# Patient Record
Sex: FEMALE | Race: WHITE | URBAN - METROPOLITAN AREA
[De-identification: names, ages, dates, MRNs, and addresses within clinical notes are randomized per-mention and may not be internally consistent; named-entity substitution may affect disease eponyms.]

---

## 2017-01-09 ENCOUNTER — OFFICE VISIT (OUTPATIENT)
Dept: FAMILY MEDICINE | Facility: CLINIC | Age: 47
End: 2017-01-09
Payer: COMMERCIAL

## 2017-01-09 VITALS
SYSTOLIC BLOOD PRESSURE: 121 MMHG | HEART RATE: 103 BPM | WEIGHT: 196.5 LBS | TEMPERATURE: 96.5 F | DIASTOLIC BLOOD PRESSURE: 82 MMHG

## 2017-01-09 DIAGNOSIS — Z90.49 S/P CHOLECYSTECTOMY: Primary | ICD-10-CM

## 2017-01-09 PROCEDURE — 99202 OFFICE O/P NEW SF 15 MIN: CPT | Performed by: PHYSICIAN ASSISTANT

## 2017-01-09 NOTE — MR AVS SNAPSHOT
"              After Visit Summary   2017    Leonila Quintanilla    MRN: [id number]           Patient Information     Date Of Birth          1970        Visit Information        Provider Department      2017 12:40 PM Kelsie Buenrostro PA-C [facility]         Follow-ups after your visit        Who to contact     If you have questions or need follow up information about today's clinic visit or your schedule please contact [facility] directly at [phone].  Normal or non-critical lab and imaging results will be communicated to you by MyChart, letter or phone within 4 business days after the clinic has received the results. If you do not hear from us within 7 days, please contact the clinic through [facility] or phone. If you have a critical or abnormal lab result, we will notify you by phone as soon as possible.  Submit refill requests through [employer] or call your pharmacy and they will forward the refill request to us. Please allow 3 business days for your refill to be completed.          Additional Information About Your Visit        [facility] Information     [employer] lets you send messages to your doctor, view your test results, renew your prescriptions, schedule appointments and more. To sign up, go to [email or web address] . Click on \"Log in\" on the left side of the screen, which will take you to the Welcome page. Then click on \"Sign up Now\" on the right side of the page.     You will be asked to enter the access code listed below, as well as some personal information. Please follow the directions to create your username and password.     Your access code is: [email or web address]  Expires: 2017  1:14 PM     Your access code will  in 90 days. If you need help or a new code, please call your [facility] or [phone].        Care EveryWhere ID     This is your Care EveryWhere ID. This could be used by other organizations to access your [place] medical " records  [id number]        Your Vitals Were     Pulse Temperature Last Period Breastfeeding?          103 96.5  F (35.8  C) (Oral) 12/01/2016 (Approximate) No         Blood Pressure from Last 3 Encounters:   01/09/17 121/82    Weight from Last 3 Encounters:   01/09/17 196 lb 8 oz (89.132 kg)              Today, you had the following     No orders found for display       Primary Care Provider    None Specified       No primary provider on file.        Thank you!     Thank you for choosing [facility]  for your care. Our goal is always to provide you with excellent care. Hearing back from our patients is one way we can continue to improve our services. Please take a few minutes to complete the written survey that you may receive in the mail after your visit with us. Thank you!             Your Updated Medication List - Protect others around you: Learn how to safely use, store and throw away your medicines at www.disposemymeds.org.      Notice  As of 1/9/2017  1:14 PM    You have not been prescribed any medications.

## 2017-01-09 NOTE — PROGRESS NOTES
SUBJECTIVE:                                                    Leonila Quintanilla is a 46 year old female who presents to clinic today for the following health issues:      New Patient/Transfer of Care    Had cholecystectomy 12/30/16, and is concerned that the site might be infected.  Better today.  Itches and pink.  Glue came off.  Surgeon didn't give her any specific regulations for work restrictions    .  Does some lifting.  Has felt some pressure at belly button with lifting.  Sees surgeon on Wed.    laparoscopic surgery.  Surgery went well.  Home the next day.  No fevers or vomiting.       Has not gone back to work.          Problem list and histories reviewed & adjusted, as indicated.  Additional history: as documented    There is no problem list on file for this patient.    Past Surgical History   Procedure Laterality Date     Cholecystostomy  Dec 2016       Social History   Substance Use Topics     Smoking status: Never Smoker      Smokeless tobacco: Not on file     Alcohol Use: Yes     History reviewed. No pertinent family history.        ROS:  As above    OBJECTIVE:                                                    /82 mmHg  Pulse 103  Temp(Src) 96.5  F (35.8  C) (Oral)  Wt 196 lb 8 oz (89.132 kg)  LMP 12/01/2016 (Approximate)  Breastfeeding? No  There is no height on file to calculate BMI.  GENERAL: healthy, alert and no distress  RESP: lungs clear to auscultation - no rales, rhonchi or wheezes  CV: regular rates and rhythm, normal S1 S2, no S3 or S4 and no murmur, click or rub  ABDOMEN: tenderness RUQ and umbilical, bowel sounds normal and well-healed incision umblical, epigastric and 2 RUQ    Diagnostic Test Results:  none      ASSESSMENT/PLAN:                                                        1. S/P cholecystectomy  Healing well.  FMLA paperwork filled out.        FUTURE APPOINTMENTS:       - Follow-up for annual visit or as needed    Kelsie Buenrostro,  MICHELLE  [facility]

## 2017-03-28 ENCOUNTER — TELEPHONE (OUTPATIENT)
Dept: FAMILY MEDICINE | Facility: CLINIC | Age: 47
End: 2017-03-28

## 2017-03-28 NOTE — TELEPHONE ENCOUNTER
Panel Management Review      Patient has the following on her problem list: None      Composite cancer screening  Chart review shows that this patient is due/due soon for the following Pap Smear  Summary:    Patient is due/failing the following:   PAP    Action needed:   Patient needs office visit for physical with pap screen.    Type of outreach:    Phone, left message for patient to call back.     Questions for provider review:    None                                                                                                                                    GALLO Roe MA       Chart routed to Care Team .

## 2017-03-28 NOTE — LETTER
April 4, 2017    Leonila Quintanilla  [address] [place] [id number]      Dear Leonila Quintanilla,     We have tried to contact you about your health, but have been unable to reach you.  Please call us as soon as possible so we can provide you with the best care possible.  We will continue to check in with you throughout the year to complete these items of care, if you are not able to complete these items at this time.  If you would like to complete the missing items for your care, please contact us at [phone].    We recommend the following:  -schedule a PAP SMEAR EXAM which is due.  Please disregard this reminder if you have had this exam elsewhere within the last year.  It would be helpful for us to have a copy of your recent pap smear report in our file so that we can best coordinate your care.    Sincerely,     Your Care Team at [place]    CORBIN Tyler

## 2017-03-28 NOTE — LETTER
March 28, 2017    Leonila Quintanilla  [address] [place] [id number]    Dear Leonila    We care about your health and have reviewed your health plan. We have reviewed your medical conditions, medication list, and lab results and are making recommendations based on this review, to better manage your health.    You are in particular need of attention regarding:  - Scheduling a Physical with a Cervical Cancer Screening (Pap Smear) age 64 and younger [phone]      Here is a list of Health Maintenance topics that are due now or due soon:  Health Maintenance Due   Topic Date Due     TETANUS IMMUNIZATION (SYSTEM ASSIGNED)  04/05/1989     PAP SCREENING Q3 YR (SYSTEM ASSIGNED)  04/05/1992     LIPID SCREEN Q5 YR FEMALE (SYSTEM ASSIGNED)  04/05/2016     We will be calling you in the next couple of weeks to help you schedule any appointments that are needed.  Please call us at [phone] (or use [employer] to address the above recommendations.     Thank you for trusting [facility] and we appreciate the opportunity to serve you.  We look forward to supporting your healthcare needs in the future.    Healthy Regards,    CORBIN Tyler

## 2017-09-11 ENCOUNTER — TELEPHONE (OUTPATIENT)
Dept: FAMILY MEDICINE | Facility: CLINIC | Age: 47
End: 2017-09-11

## 2017-09-11 NOTE — LETTER
September 11, 2017    Leonila Quintanilla  [address] [place] [id number]    Dear Leonila    We care about your health and have reviewed your health plan. We have reviewed your medical conditions, medication list, and lab results and are making recommendations based on this review, to better manage your health.    You are in particular need of attention regarding:  - Scheduling a Physical with a Cervical Cancer Screening (Pap Smear) age 64 and younger [phone]      Here is a list of Health Maintenance topics that are due now or due soon:  Health Maintenance Due   Topic Date Due     TETANUS IMMUNIZATION (SYSTEM ASSIGNED)  04/05/1989     PAP SCREENING Q3 YR (SYSTEM ASSIGNED)  04/05/1992     LIPID SCREEN Q5 YR FEMALE (SYSTEM ASSIGNED)  04/05/2016     INFLUENZA VACCINE (SYSTEM ASSIGNED)  09/01/2017     We will be calling you in the next couple of weeks to help you schedule any appointments that are needed.  Please call us at [phone] (or use [employer]) to address the above recommendations.     Thank you for trusting [facility] and we appreciate the opportunity to serve you.  We look forward to supporting your healthcare needs in the future.    Healthy Regards,    CORBIN Tyler/CM

## 2019-06-04 ENCOUNTER — OFFICE VISIT (OUTPATIENT)
Dept: FAMILY MEDICINE | Facility: CLINIC | Age: 49
End: 2019-06-04
Payer: COMMERCIAL

## 2019-06-04 VITALS
SYSTOLIC BLOOD PRESSURE: 120 MMHG | TEMPERATURE: 98.3 F | RESPIRATION RATE: 16 BRPM | HEIGHT: 64 IN | BODY MASS INDEX: 35.17 KG/M2 | OXYGEN SATURATION: 97 % | DIASTOLIC BLOOD PRESSURE: 89 MMHG | WEIGHT: 206 LBS | HEART RATE: 117 BPM

## 2019-06-04 DIAGNOSIS — J32.0 LEFT MAXILLARY SINUSITIS: Primary | ICD-10-CM

## 2019-06-04 PROCEDURE — 99213 OFFICE O/P EST LOW 20 MIN: CPT | Performed by: NURSE PRACTITIONER

## 2019-06-04 ASSESSMENT — MIFFLIN-ST. JEOR: SCORE: 1549.41

## 2019-06-04 ASSESSMENT — PAIN SCALES - GENERAL: PAINLEVEL: NO PAIN (0)

## 2019-06-04 NOTE — PATIENT INSTRUCTIONS
Start augmentin 1 tab every 12 hours x10 days  Hot, steamy shower this evening  Apply warm pack to face  If worsening this weekend go to emergency department  Follow up with primary care provider in the next week

## 2019-06-04 NOTE — PROGRESS NOTES
"Subjective     Leonila Quintanilla is a 49 year old female who presents to clinic today for the following health issues:    HPI   Acute Illness   Acute illness concerns: sinus pressure  Onset: since today     Fever: no    Chills/Sweats: no    Headache (location?): no    Sinus Pressure:YES    Conjunctivitis:  no    Ear Pain: no    Rhinorrhea: no    Congestion: no    Sore Throat: no     Cough: no    Wheeze: no    Decreased Appetite: no    Nausea: no    Vomiting: no    Diarrhea:  no    Dysuria/Freq.: no    Fatigue/Achiness: no    Sick/Strep Exposure: no     Therapies Tried and outcome: claritin and ibuprofen     -swollen left cheek   -when sneezing, there is a strange ordor  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    Past Surgical History:   Procedure Laterality Date     CHOLECYSTOSTOMY  Dec 2016       Social History     Tobacco Use     Smoking status: Never Smoker     Smokeless tobacco: Never Used   Substance Use Topics     Alcohol use: Yes     History reviewed. No pertinent family history.      Current Outpatient Medications   Medication Sig Dispense Refill     amoxicillin-clavulanate (AUGMENTIN) 875-125 MG tablet Take 1 tablet by mouth 2 times daily for 10 days 20 tablet 0     No Known Allergies    Reviewed and updated as needed this visit by Provider  Tobacco  Allergies  Meds  Problems  Med Hx  Surg Hx  Fam Hx         Review of Systems   ROS COMP: Constitutional, HEENT, cardiovascular, pulmonary, gi and gu systems are negative, except as otherwise noted.      Objective    /89 (BP Location: Right arm, Patient Position: Chair, Cuff Size: Adult Regular)   Pulse 117   Temp 98.3  F (36.8  C) (Oral)   Resp 16   Ht 1.626 m (5' 4\")   Wt 93.4 kg (206 lb)   LMP 05/21/2019 (Approximate)   SpO2 97%   BMI 35.36 " "kg/m    Body mass index is 35.36 kg/m .  Physical Exam   GENERAL: healthy, alert and no distress  EYES: Eyes grossly normal to inspection, PERRL and conjunctivae and sclerae normal  HENT: ear canals and TM's normal, nose and mouth without ulcers or lesions. Left maxillary sinus tenderness with palpation  RESP: lungs clear to auscultation - no rales, rhonchi or wheezes  CV: regular rate and rhythm, normal S1 S2, no S3 or S4, no murmur, click or rub, no peripheral edema and peripheral pulses strong  MS: no gross musculoskeletal defects noted, no edema  PSYCH: mentation appears normal, affect normal/bright    Diagnostic Test Results:  Labs reviewed in Epic        Assessment & Plan     1. Left maxillary sinusitis  Start augmentin 1 tab every 12 hours x10 days  Hot, steamy shower this evening  Apply warm pack to face  If worsening this weekend go to emergency department  Follow up with primary care provider in the next week  - amoxicillin-clavulanate (AUGMENTIN) 875-125 MG tablet; Take 1 tablet by mouth 2 times daily for 10 days  Dispense: 20 tablet; Refill: 0       Due for pap - has follow up planned    BMI:   Estimated body mass index is 35.36 kg/m  as calculated from the following:    Height as of this encounter: 1.626 m (5' 4\").    Weight as of this encounter: 93.4 kg (206 lb).           See Patient Instructions    Return in about 1 week (around 6/11/2019), or if symptoms worsen or fail to improve.     The benefits, risks and potential side effects were discussed in detail. Black box warnings discussed as relevant. All patient questions were answered. The patient was instructed to follow up immediately if any adverse reactions develop.    Return precautions discussed, including when to seek urgent/emergent care.    Patient verbalizes understanding and agrees with plan of care. Patient stable for discharge.    LAWANDA Gregg [facility]        "

## 2019-08-02 ENCOUNTER — TELEPHONE (OUTPATIENT)
Dept: FAMILY MEDICINE | Facility: CLINIC | Age: 49
End: 2019-08-02

## 2019-08-02 NOTE — TELEPHONE ENCOUNTER
Panel Management Review      Patient has the following on her problem list: None      Composite cancer screening  Chart review shows that this patient is due/due soon for the following Pap Smear  Summary:    Patient is due/failing the following:   PAP and PHYSICAL    Action needed:   Patient needs office visit for physical .    Type of outreach:    Sent letter.    Questions for provider review:    None                                                                                                                                    Darrell Byers MA       Chart routed to Care Team .

## 2019-08-02 NOTE — LETTER
August 2, 2019    Leonila Quintanilla  [address] [place] [address]    Dear Leonila    We care about your health and have reviewed your health plan. We have reviewed your medical conditions, medication list, and lab results and are making recommendations based on this review, to better manage your health.    You are in particular need of attention regarding:  - Scheduling a Physical with a Cervical Cancer Screening (Pap Smear) age 64 and younger [phone]      Here is a list of Health Maintenance topics that are due now or due soon:  Health Maintenance Due   Topic Date Due     PREVENTIVE CARE VISIT  1970     PAP  1970     HIV SCREENING  04/05/1986     DTAP/TDAP/TD IMMUNIZATION (1 - Tdap) 04/05/1996     LIPID  04/05/2016       Please call us at [phone] (or use [employer]) to address the above recommendations. If we do not hear from you in the next couple of weeks we will be reaching out to you again.    Thank you for trusting [facility] and we appreciate the opportunity to serve you.  We look forward to supporting your healthcare needs in the future.    Healthy Regards,     Martha Buenrostro